# Patient Record
Sex: MALE | URBAN - METROPOLITAN AREA
[De-identification: names, ages, dates, MRNs, and addresses within clinical notes are randomized per-mention and may not be internally consistent; named-entity substitution may affect disease eponyms.]

---

## 2021-09-02 ENCOUNTER — OFFICE VISIT (OUTPATIENT)
Dept: URBAN - METROPOLITAN AREA CLINIC 37 | Facility: CLINIC | Age: 51
End: 2021-09-02

## 2021-09-02 VITALS
WEIGHT: 134 LBS | HEIGHT: 63 IN | SYSTOLIC BLOOD PRESSURE: 140 MMHG | DIASTOLIC BLOOD PRESSURE: 94 MMHG | BODY MASS INDEX: 23.74 KG/M2

## 2021-09-02 PROBLEM — 6185008: Status: ACTIVE | Noted: 2021-09-02

## 2021-09-02 PROBLEM — 267434003: Status: ACTIVE | Noted: 2021-09-02

## 2021-09-02 PROBLEM — 59621000: Status: ACTIVE | Noted: 2021-09-02

## 2021-09-02 PROBLEM — 275978004 SCREENING FOR MALIGNANT NEOPLASM OF COLON: Status: ACTIVE | Noted: 2021-09-02

## 2021-09-02 RX ORDER — SODIUM PICOSULFATE, MAGNESIUM OXIDE, AND ANHYDROUS CITRIC ACID 10; 3.5; 12 MG/160ML; G/160ML; G/160ML
AS DIRECTED LIQUID ORAL ONCE
Qty: 1 KIT | Refills: 0 | OUTPATIENT
Start: 2021-09-02

## 2021-09-02 NOTE — HPI-MIGRATED HPI
Colorectal cancer screening : Family history of GI malignancy: -> Denies;   Colorectal cancer screening : Patient is here for initial consultation for -> first time screening colonoscopy;   Colorectal cancer screening : Patients denies -> rectal bleeding, change in bowel habits, constipation, diarrhea, or abdominal pain;   Colorectal cancer screening : Current bowel movement patterns -> One soft BM daily;   Interim investigations : Labs done on: ->  * 07/14/2021 ordered by PCP:  - CMP: Glu: (TNP) ; BUN: 15 ; Cr: 0.74 (L) ; Na: 138 ; K: (TNP); Alb: 4.6 ; Tbili: 0.4 ; ALP: 62 ; ALT: 31 ; AST: 19 - CBC: WBC: 6.4 ; RBC: 5.12 ; Hgb: 16.1 ; Hct: 47.0 ; Plt: 312 UBT: positive;     Colorectal cancer screening : Denies dysphagia or odynophagia Currently taking anticoagulants/NSAIDs: none;

## 2021-09-04 ENCOUNTER — LAB OUTSIDE AN ENCOUNTER (OUTPATIENT)
Dept: URBAN - METROPOLITAN AREA CLINIC 37 | Facility: CLINIC | Age: 51
End: 2021-09-04

## 2021-09-10 ENCOUNTER — OFFICE VISIT (OUTPATIENT)
Dept: URBAN - METROPOLITAN AREA SURGERY CENTER 8 | Facility: SURGERY CENTER | Age: 51
End: 2021-09-10

## 2021-09-24 ENCOUNTER — DASHBOARD ENCOUNTERS (OUTPATIENT)
Age: 51
End: 2021-09-24

## 2021-09-24 PROBLEM — 724538004: Status: ACTIVE | Noted: 2021-09-24

## 2021-10-01 ENCOUNTER — OFFICE VISIT (OUTPATIENT)
Dept: URBAN - METROPOLITAN AREA CLINIC 37 | Facility: CLINIC | Age: 51
End: 2021-10-01

## 2021-10-01 NOTE — HPI-MIGRATED HPI
Post-op OV : After EGD on -> ;   Post-op OV : After Colonoscopy on -> 09/10/2021, at which time seven small polyps were detected and resected. Histology showed they were tubular adenoma and hyperplastic polyp. Diverticulosis were noted in the ascending colon. Non- bleeding grade I external and internal hemorrhoids were found during retroflexion Good bowel prep;   Post-op OV : Patient denies -> rectal bleeding, fever, nausea & vomiting since the procedure date;   Post-op OV : Patient -> denies any new changes in his/her health status since last OV.  Current BM: soft BM daily;

## 2021-10-01 NOTE — EXAM-MIGRATED EXAMINATIONS
----- Message from Fernie Ventura MD sent at 12/30/2018  6:14 PM CST -----  Hello, can this patient be scheduled with a CT of the head without contrast and an appointment with Dr Oconnor or Emma in one week please.   General Examination : via Televisit;